# Patient Record
Sex: MALE | Race: WHITE | NOT HISPANIC OR LATINO | Employment: FULL TIME | URBAN - METROPOLITAN AREA
[De-identification: names, ages, dates, MRNs, and addresses within clinical notes are randomized per-mention and may not be internally consistent; named-entity substitution may affect disease eponyms.]

---

## 2020-11-23 ENCOUNTER — OFFICE VISIT (OUTPATIENT)
Dept: ORTHOPEDIC SURGERY | Facility: CLINIC | Age: 40
End: 2020-11-23

## 2020-11-23 VITALS — HEIGHT: 73 IN | WEIGHT: 172 LBS | TEMPERATURE: 98 F | BODY MASS INDEX: 22.8 KG/M2

## 2020-11-23 DIAGNOSIS — M25.561 RIGHT KNEE PAIN, UNSPECIFIED CHRONICITY: Primary | ICD-10-CM

## 2020-11-23 PROCEDURE — 99204 OFFICE O/P NEW MOD 45 MIN: CPT | Performed by: ORTHOPAEDIC SURGERY

## 2020-11-23 RX ORDER — MULTIPLE VITAMINS W/ MINERALS TAB 9MG-400MCG
1 TAB ORAL DAILY
COMMUNITY

## 2020-11-24 NOTE — PROGRESS NOTES
Patient: Cristian Moscoso    YOB: 1980    Medical Record Number: [id number]    Chief Complaints:  Right knee injury    History of Present Illness:     40 y.o. male patient who presents for evaluation of the right knee.  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  He twisted his knee and felt something pop.  Current pain is described as moderate, constant and aching.  The patient has noticed associated swelling and difficulty bearing weight on the leg.  Denies other injuries or complaints.  Rest and anti-inflammatories have helped modestly with the discomfort.    Allergies:   Allergies   Allergen Reactions   • Soy Protein [Soybean Oil] Other (See Comments)     Mild constriction of throat.       Home Medications    Current Outpatient Medications:   •  Ascorbic Acid (VITAMIN C PO), Take  by mouth., Disp: , Rfl:   •  multivitamin with minerals (MULTIVITAMIN ADULT PO), Take 1 tablet by mouth Daily., Disp: , Rfl:     History reviewed. No pertinent past medical history.    History reviewed. No pertinent surgical history.    Social History     Occupational History   • Not on file   Tobacco Use   • Smoking status: Not on file   Substance and Sexual Activity   • Alcohol use: Not on file   • Drug use: Not on file   • Sexual activity: Not on file      Social History     Social History Narrative   • Not on file   He is a teacher.  No history of alcohol abuse, illicit drug use or tobacco use.    History reviewed. No pertinent family history.    Review of Systems:      Constitutional: Denies fever, shaking or chills   Eyes: Denies change in visual acuity   HEENT: Denies nasal congestion or sore throat   Respiratory: Denies cough or shortness of breath   Cardiovascular: Denies chest pain or edema  Endocrine: Denies tremors, palpitations, intolerance of heat or cold, polyuria, polydipsia.  GI: Denies abdominal pain, nausea, vomiting, bloody stools or diarrhea  : Denies  "frequency, urgency, incontinence, retention, or nocturia.  Musculoskeletal: Denies numbness, tingling or loss of motor function except as above  Integument: Denies rash, lesion or ulceration   Neurologic: Denies headache or focal weakness, deficits  Heme: Denies spontaneous or excessive bleeding, epistaxis, hematuria, melena, fatigue, enlarged or tender lymph nodes.      All other pertinent positives and negatives as noted above in HPI.    Physical Exam:   40 y.o. male  Vitals:    11/23/20 0959   Temp: 98 °F (36.7 °C)   Weight: 78 kg (172 lb)   Height: 185.4 cm (73\")     General:  Patient is awake and alert.  Appears in no acute distress or discomfort.    Psych:  Affect and demeanor are appropriate.    Eyes:  Conjunctiva and sclera appear grossly normal.  Eyes track well and EOM seem to be intact.    Ears:  No gross abnormalities.  Hearing adequate for the exam.    Cardiovascular:  Regular rate and rhythm.    Lungs:  Good chest expansion.  Breathing unlabored.    Spine:  Back appears grossly normal.  No palpable masses or adenopathy.  Good motion.    Extremities:  Right knee is examined.  Skin is benign.  No atrophy, swellings, or masses.  Focal tenderness along the lateral joint line.  There is a moderate effusion.  Knee motion is limited and painful.  He lacks 5 degrees of extension and can flex to about 80 degrees.  Positive Lachmans.  Negative pivot shift.  His knee is stable to varus and valgus.  His exam is guarded.  Good strength with hip flexion, knee extension, ankle and great toe plantar flexion and dorsiflexion.  Sensation is intact distally.  Brisk capillary refill in the toes.  Palpable pedal pulses.         Radiology:   Outside AP and lateral views of the right knee are brought in by the patient and reviewed.  He has a Segond fracture.  No other significant abnormalities on the x-rays.  MRI the right knee is reviewed along with the report.  He has a complete ACL tear and bone bruise pattern consistent " with that diagnosis.  There is a lateral meniscal tear.  He has a Segond fracture.  No other significant findings.    Assessment/Plan: Right ACL and lateral meniscal tears    I showed him the MRI and we had an extensive discussion about his options.  We discussed surgical and nonsurgical treatment options in detail.  He feels like his knee is unstable and he wants to pursue surgical reconstruction.  I told him that we need him to recover his motion before it will be safe to proceed with surgery.  I think that is probably going to take him a few weeks.  I recommend that we go ahead and get him into therapy at this time.  He was given an order for that.  We can tentatively look at getting him scheduled for surgery in mid to late December.  We thoroughly discussed the details of the proposed procedure including ACL reconstruction with allograft, lateral meniscal repair versus debridement and addressing any unforseen pathology as indicated.  I reviewed details of procedure with patient today and discussed all the risks, benefits, alternatives, and limitations of the procedure in laymen's terms with the risks including but not limited to:  neurovascular damage, bleeding, infection, hematoma, chronic pain, worsening of pain, re-tear potentially necessitating revision, hardware related complications including pain or problems necessitating removal, chondrolysis, swelling, loss of motion and arthrofibrosis, weakness, stiffness, instability, DVT, pulmonary embolus, death, stroke, complex regional pain syndrome, and need for additional procedures.  Furthermore, we discussed graft options including autograft versus allograft and the pros and cons of each.  He verbalized understanding, and was given the opportunity to ask and have all questions answered today.  He has consented to proceed with use of allograft.  No guarantees were given regarding results of surgery.     Duglas Salomon MD    11/23/2020

## 2020-11-25 ENCOUNTER — PREP FOR SURGERY (OUTPATIENT)
Dept: OTHER | Facility: HOSPITAL | Age: 40
End: 2020-11-25

## 2020-12-03 ENCOUNTER — PREP FOR SURGERY (OUTPATIENT)
Dept: OTHER | Facility: HOSPITAL | Age: 40
End: 2020-12-03

## 2020-12-04 ENCOUNTER — PREP FOR SURGERY (OUTPATIENT)
Dept: OTHER | Facility: HOSPITAL | Age: 40
End: 2020-12-04

## 2020-12-04 DIAGNOSIS — M25.561 RIGHT KNEE PAIN, UNSPECIFIED CHRONICITY: Primary | ICD-10-CM

## 2020-12-04 RX ORDER — CEFAZOLIN SODIUM 2 G/100ML
2 INJECTION, SOLUTION INTRAVENOUS ONCE
Status: CANCELLED | OUTPATIENT
Start: 2021-03-23 | End: 2020-12-04

## 2020-12-07 ENCOUNTER — TELEPHONE (OUTPATIENT)
Dept: ORTHOPEDIC SURGERY | Facility: CLINIC | Age: 40
End: 2020-12-07

## 2020-12-07 PROBLEM — M25.561 RIGHT KNEE PAIN: Status: ACTIVE | Noted: 2020-12-07

## 2020-12-07 NOTE — TELEPHONE ENCOUNTER
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    LISETTE NEWBERRY MAY BE REACHED AT:  [phone]

## 2020-12-14 ENCOUNTER — TELEPHONE (OUTPATIENT)
Dept: ORTHOPEDIC SURGERY | Facility: CLINIC | Age: 40
End: 2020-12-14

## 2020-12-14 NOTE — TELEPHONE ENCOUNTER
Probably not for 1 month.  If it was his left knee, he could drive within 1 week.  Because it is the right, he will probably need about a month.

## 2020-12-14 NOTE — TELEPHONE ENCOUNTER
Provider: CLAUDIA BAXTER  Caller: URBAN CONI  Relationship to Patient: SELF    Phone Number: [phone]    Reason for Call: PATIENT CALLED WANTING TO KNOW IF HE IS ABLE TO DRIVE    When was the patient last seen: 11/23/2020

## 2020-12-15 NOTE — TELEPHONE ENCOUNTER
Patient states he has been doing PT on his own since his r knee injury 10/18/2020. patient states he feels he can drive and he is walking w/out crutches for the last 2 weeks. [facility]. Is it ok for him to drive?

## 2020-12-15 NOTE — TELEPHONE ENCOUNTER
Yes.  Sorry, I misunderstood.  He can drive now.  I thought he meant after surgery.  After the surgery I recommend no driving for about a month because it is his right knee.

## 2021-01-20 ENCOUNTER — OFFICE VISIT (OUTPATIENT)
Dept: ORTHOPEDIC SURGERY | Facility: CLINIC | Age: 41
End: 2021-01-20

## 2021-01-20 VITALS — HEIGHT: 73 IN | TEMPERATURE: 97.2 F | BODY MASS INDEX: 22.4 KG/M2 | WEIGHT: 169 LBS

## 2021-01-20 DIAGNOSIS — Z01.818 PRE-OP EVALUATION: Primary | ICD-10-CM

## 2021-01-20 PROCEDURE — S0260 H&P FOR SURGERY: HCPCS | Performed by: ORTHOPAEDIC SURGERY

## 2021-01-20 NOTE — PROGRESS NOTES
"   History & Physical       Patient: Cristian Moscoso    YOB: 1980    Medical Record Number: [id number]    Chief Complaints:  Right knee ACL tear, lateral meniscal tear    History of Present Illness: 40 y.o. male presents today in anticipation for his upcoming right knee ACL reconstruction.  Denies any significant changes to his symptomatology.  He is still experiencing symptomatic instability.  He says that his pain is basically resolved at this point and his motion has returned to normal.    Allergies:   Allergies   Allergen Reactions   • Soy Protein [Soybean Oil] Other (See Comments)     Mild constriction of throat.       Medications:   Home Medications:    Current Outpatient Medications:   •  Ascorbic Acid (VITAMIN C PO), Take  by mouth., Disp: , Rfl:   •  multivitamin with minerals (MULTIVITAMIN ADULT PO), Take 1 tablet by mouth Daily., Disp: , Rfl:     History reviewed. No pertinent past medical history.     History reviewed. No pertinent surgical history.       Social History     Occupational History   • Not on file   Tobacco Use   • Smoking status: Not on file   Substance and Sexual Activity   • Alcohol use: Not on file   • Drug use: Not on file   • Sexual activity: Not on file      Social History     Social History Narrative   • Not on file        History reviewed. No pertinent family history.    Review of Systems:  A 14 point review of systems is reviewed with the patient.  Pertinent positives are listed above.  All others are negative.    Physical Exam: 40 y.o. male    Vitals:    01/20/21 1116   Temp: 97.2 °F (36.2 °C)   Weight: 76.7 kg (169 lb)   Height: 185.4 cm (73\")       General:  Patient is awake and alert.  Appears in no acute distress or discomfort.    Psych:  Affect and demeanor are appropriate.    Eyes:  Conjunctiva and sclera appear grossly normal.  Eyes track well and EOM seem to be intact.    Ears:  No gross abnormalities.  Hearing adequate for the exam.    Cardiovascular:  " Regular rate and rhythm.    Lungs:  Good chest expansion.  Breathing unlabored.    Lymph:  No palpable adenopathy about neck or axilla.    Right lower extremity:  Skin benign and intact without evidence for swelling, masses or atrophy.  No palpable masses. No focal tenderness noted.  .  ROM is full.  Negative medial Robin's, Positive lateral Robin's.  Positive Lachman's but the knee is otherwise stable.  Good strength throughout the lower leg and foot.  Intact sensation throughout.  Palpable pedal pulses with brisk cap refill.    Diagnostic Tests:  No results found for: GLUCOSE, CALCIUM, NA, K, CO2, CL, BUN, CREATININE, EGFRIFAFRI, EGFRIFNONA, BCR, ANIONGAP  No results found for: WBC, HGB, HCT, MCV, PLT  No results found for: INR, PROTIME    Imaging:  Previous x-rays and MRI of the right knee are reviewed.  The studies show a complete ACL tear, bone bruise and lateral meniscal tear    Assessment:  Right knee ACL tear    Plan: We will plan on proceeding with a right knee arthroscopy at the patient's request including ACL reconstruction with allograft, lateral meniscal repair versus debridement and addressing any unforseen pathology as indicated.  I reviewed details of procedure with patient today and discussed all the risks, benefits, alternatives, and limitations of the procedure in laymen's terms with the risks including but not limited to:  neurovascular damage, bleeding, infection, hematoma, chronic pain, worsening of pain, re-tear potentially necessitating revision, hardware related complications including pain or problems necessitating removal, chondrolysis, swelling, loss of motion and arthrofibrosis, weakness, stiffness, instability, DVT, pulmonary embolus, death, stroke, complex regional pain syndrome, and need for additional procedures.  Furthermore, we discussed graft options including autograft versus allograft and the pros and cons of each.  With allograft we talked about the higher rate of re-tear  and concerns related to allograft itself including possible disease transmission.  Patient verbalized understanding, and was given the opportunity to ask and have all questions answered today.  Patient has consented to proceed with ACL reconstruction with allograft tissue.  No guarantees were given regarding results of surgery.     Date: 1/20/2021    Duglas Salomon MD

## 2021-01-28 ENCOUNTER — TRANSCRIBE ORDERS (OUTPATIENT)
Dept: PREADMISSION TESTING | Facility: HOSPITAL | Age: 41
End: 2021-01-28

## 2021-01-28 DIAGNOSIS — Z01.818 OTHER SPECIFIED PRE-OPERATIVE EXAMINATION: Primary | ICD-10-CM

## 2021-01-29 DIAGNOSIS — M25.561 RIGHT KNEE PAIN, UNSPECIFIED CHRONICITY: Primary | ICD-10-CM

## 2021-01-29 PROBLEM — Z98.890 S/P ACL RECONSTRUCTION: Status: ACTIVE | Noted: 2021-01-29

## 2021-02-02 ENCOUNTER — APPOINTMENT (OUTPATIENT)
Dept: PREADMISSION TESTING | Facility: HOSPITAL | Age: 41
End: 2021-02-02

## 2021-02-02 VITALS
RESPIRATION RATE: 16 BRPM | HEIGHT: 73 IN | HEART RATE: 72 BPM | BODY MASS INDEX: 22.21 KG/M2 | OXYGEN SATURATION: 100 % | TEMPERATURE: 97.6 F | SYSTOLIC BLOOD PRESSURE: 115 MMHG | DIASTOLIC BLOOD PRESSURE: 63 MMHG | WEIGHT: 167.6 LBS

## 2021-02-02 LAB
DEPRECATED RDW RBC AUTO: 37.8 FL (ref 37–54)
ERYTHROCYTE [DISTWIDTH] IN BLOOD BY AUTOMATED COUNT: 11.8 % (ref 12.3–15.4)
HCT VFR BLD AUTO: 41.7 % (ref 37.5–51)
HGB BLD-MCNC: 13.8 G/DL (ref 13–17.7)
MCH RBC QN AUTO: 29.5 PG (ref 26.6–33)
MCHC RBC AUTO-ENTMCNC: 33.1 G/DL (ref 31.5–35.7)
MCV RBC AUTO: 89.1 FL (ref 79–97)
PLATELET # BLD AUTO: 211 10*3/MM3 (ref 140–450)
PMV BLD AUTO: 9.8 FL (ref 6–12)
RBC # BLD AUTO: 4.68 10*6/MM3 (ref 4.14–5.8)
WBC # BLD AUTO: 4.9 10*3/MM3 (ref 3.4–10.8)

## 2021-02-02 PROCEDURE — 85027 COMPLETE CBC AUTOMATED: CPT

## 2021-02-02 PROCEDURE — 36415 COLL VENOUS BLD VENIPUNCTURE: CPT

## 2021-02-02 NOTE — DISCHARGE INSTRUCTIONS
Take the following medications the morning of surgery:    NONE    If you are on prescription narcotic pain medication to control your pain you may also take that medication the morning of surgery.    General Instructions:  • Do not eat solid food after midnight the night before surgery.  • You may drink clear liquids day of surgery but must stop at least one hour before your hospital arrival time.  It is beneficial for you to have a clear drink that contains carbohydrates the day of surgery.  We suggest a 12 to 20 ounce bottle of Gatorade or Powerade for non-diabetic patients  Clear liquids are liquids you can see through.  Nothing red in color.     Plain water                               Sports drinks  Sodas                                   Gelatin (Jell-O)  Fruit juices without pulp such as white grape juice and apple juice  Popsicles that contain no fruit or yogurt  Tea or coffee (no cream or milk added)  Gatorade / Powerade  G2 / Powerade Zero    • .   • Patients who avoid smoking, chewing tobacco and alcohol for 4 weeks prior to surgery have a reduced risk of post-operative complications.  Quit smoking as many days before surgery as you can.  • Do not smoke, use chewing tobacco or drink alcohol the day of surgery.   • Bring any papers given to you in the doctor’s office.  • Wear clean comfortable clothes.  • Do not wear contact lenses, false eyelashes or make-up.  Bring a case for your glasses.   • Bring crutches or walker if applicable.  • Remove all piercings.  Leave jewelry and any other valuables at home.  • Hair extensions with metal clips must be removed prior to surgery.  • The Pre-Admission Testing nurse will instruct you to bring medications if unable to obtain an accurate list in Pre-Admission Testing.   • REPORT TO MAIN AND THEN OVER TO OUTPATIENT SURGERY ON 2- AT 0530 AM           Preventing a Surgical Site Infection:  • For 2 to 3 days before surgery, avoid shaving with a razor because  the razor can irritate skin and make it easier to develop an infection.    • Any areas of open skin can increase the risk of a post-operative wound infection by allowing bacteria to enter and travel throughout the body.  Notify your surgeon if you have any skin wounds / rashes even if it is not near the expected surgical site.  The area will need assessed to determine if surgery should be delayed until it is healed.  • The night prior to surgery shower using a fresh bar of anti-bacterial soap (such as Dial) and clean washcloth.  Sleep in a clean bed with clean clothing.  Do not allow pets to sleep with you.  • Shower on the morning of surgery using a fresh bar of anti-bacterial soap (such as Dial) and clean washcloth.  Dry with a clean towel and dress in clean clothing.  • Ask your surgeon if you will be receiving antibiotics prior to surgery.  • Make sure you, your family, and all healthcare providers clean their hands with soap and water or an alcohol based hand  before caring for you or your wound.    Day of surgery:  Your arrival time is approximately two hours before your scheduled surgery time.  Upon arrival, a Pre-op nurse and Anesthesiologist will review your health history, obtain vital signs, and answer questions you may have.  The only belongings needed at this time will be a list of your home medications and if applicable your C-PAP/BI-PAP machine.  A Pre-op nurse will start an IV and you may receive medication in preparation for surgery, including something to help you relax.     Please be aware that surgery does come with discomfort.  We want to make every effort to control your discomfort so please discuss any uncontrolled symptoms with your nurse.   Your doctor will most likely have prescribed pain medications.      If you are going home after surgery you will receive individualized written care instructions before being discharged.  A responsible adult must drive you to and from the  hospital on the day of your surgery and stay with you for 24 hours.  Discharge prescriptions can be filled by the hospital pharmacy during regular pharmacy hours.  If you are having surgery late in the day/evening your prescription may be e-prescribed to your pharmacy.  Please verify your pharmacy hours or chose a 24 hour pharmacy to avoid not having access to your prescription because your pharmacy has closed for the day.        If you have any questions please call Pre-Admission Testing at [phone].  Deductibles and co-payments are collected on the day of service. Please be prepared to pay the required co-pay, deductible or deposit on the day of service as defined by your plan.    Patient Education for Self-Quarantine Process    Following your COVID testing, we strongly recommend that you do not leave your home after you have been tested for COVID except to get medical care. This includes not going to work, school or to public areas.  If this is not possible for you to do please limit your activities to only required outings.  Be sure to wear a mask when you are with other people, practice social distancing and wash your hands frequently.      The following items provide additional details to keep you safe.  • Wash your hands with soap and water frequently for at least 20 seconds.   • Avoid touching your eyes, nose and mouth with unwashed hands.  • Do not share anything - utensils, towels, food from the same bowl.   • Have your own utensils, drinking glass, dishes, towels and bedding.   • Do not have visitors.   • Do use FaceTime to stay in touch with family and friends.  • You should stay in a specific room away from others if possible.   • Stay at least 6 feet away from others in the home if you cannot have a dedicated room to yourself.   • Do not snuggle with your pet. While the CDC says there is no evidence that pets can spread COVID-19 or be infected from humans, it is probably best to avoid “petting,  snuggling, being kissed or licked and sharing food (during self-quarantine)”, according to the CDC.   • Sanitize household surfaces daily. Include all high touch areas (door handles, light switches, phones, countertops, etc.)  • Do not share a bathroom with others, if possible.   • Wear a mask around others in your home if you are unable to stay in a separate room or 6 feet apart. If  you are unable to wear a mask, have your family member wear a mask if they must be within 6 feet of you.   Call your surgeon immediately if you experience any of the following symptoms:  • Sore Throat  • Shortness of Breath or difficulty breathing  • Cough  • Chills  • Body soreness or muscle pain  • Headache  • Fever  • New loss of taste or smell  • Do not arrive for your surgery ill.  Your procedure will need to be rescheduled to another time.  You will need to call your physician before the day of surgery to avoid any unnecessary exposure to hospital staff as well as other patients.

## 2021-02-13 ENCOUNTER — LAB (OUTPATIENT)
Dept: LAB | Facility: HOSPITAL | Age: 41
End: 2021-02-13

## 2021-02-13 DIAGNOSIS — Z01.818 OTHER SPECIFIED PRE-OPERATIVE EXAMINATION: ICD-10-CM

## 2021-02-13 PROCEDURE — U0004 COV-19 TEST NON-CDC HGH THRU: HCPCS

## 2021-02-13 PROCEDURE — C9803 HOPD COVID-19 SPEC COLLECT: HCPCS

## 2021-02-15 LAB — SARS-COV-2 RNA RESP QL NAA+PROBE: NOT DETECTED

## 2021-02-16 ENCOUNTER — APPOINTMENT (OUTPATIENT)
Dept: GENERAL RADIOLOGY | Facility: HOSPITAL | Age: 41
End: 2021-02-16

## 2021-02-16 ENCOUNTER — ANESTHESIA EVENT (OUTPATIENT)
Dept: PERIOP | Facility: HOSPITAL | Age: 41
End: 2021-02-16

## 2021-02-16 ENCOUNTER — HOSPITAL ENCOUNTER (OUTPATIENT)
Facility: HOSPITAL | Age: 41
Setting detail: HOSPITAL OUTPATIENT SURGERY
Discharge: HOME | End: 2021-02-16
Attending: ORTHOPAEDIC SURGERY | Admitting: ORTHOPAEDIC SURGERY

## 2021-02-16 ENCOUNTER — ANESTHESIA (OUTPATIENT)
Dept: PERIOP | Facility: HOSPITAL | Age: 41
End: 2021-02-16

## 2021-02-16 VITALS
DIASTOLIC BLOOD PRESSURE: 83 MMHG | RESPIRATION RATE: 16 BRPM | HEART RATE: 55 BPM | TEMPERATURE: 97.3 F | OXYGEN SATURATION: 100 % | SYSTOLIC BLOOD PRESSURE: 125 MMHG

## 2021-02-16 DIAGNOSIS — M25.561 RIGHT KNEE PAIN, UNSPECIFIED CHRONICITY: ICD-10-CM

## 2021-02-16 DIAGNOSIS — Z98.890 S/P ACL RECONSTRUCTION: Primary | ICD-10-CM

## 2021-02-16 PROCEDURE — 76000 FLUOROSCOPY <1 HR PHYS/QHP: CPT

## 2021-02-16 PROCEDURE — 25010000002 MIDAZOLAM PER 1 MG: Performed by: ANESTHESIOLOGY

## 2021-02-16 PROCEDURE — 25010000003 CEFAZOLIN IN DEXTROSE 2-4 GM/100ML-% SOLUTION: Performed by: NURSE PRACTITIONER

## 2021-02-16 PROCEDURE — 25010000002 DEXAMETHASONE PER 1 MG: Performed by: NURSE ANESTHETIST, CERTIFIED REGISTERED

## 2021-02-16 PROCEDURE — 25010000002 GENTAMICIN PER 80 MG: Performed by: ORTHOPAEDIC SURGERY

## 2021-02-16 PROCEDURE — 25010000002 FENTANYL CITRATE (PF) 100 MCG/2ML SOLUTION: Performed by: NURSE ANESTHETIST, CERTIFIED REGISTERED

## 2021-02-16 PROCEDURE — C1713 ANCHOR/SCREW BN/BN,TIS/BN: HCPCS | Performed by: ORTHOPAEDIC SURGERY

## 2021-02-16 PROCEDURE — 25010000002 PROPOFOL 10 MG/ML EMULSION: Performed by: NURSE ANESTHETIST, CERTIFIED REGISTERED

## 2021-02-16 PROCEDURE — 29888 ARTHRS AID ACL RPR/AGMNTJ: CPT | Performed by: ORTHOPAEDIC SURGERY

## 2021-02-16 PROCEDURE — L1833 KO ADJ JNT POS R SUP PRE OTS: HCPCS | Performed by: ORTHOPAEDIC SURGERY

## 2021-02-16 PROCEDURE — L1830 KO IMMOB CANVAS LONG PRE OTS: HCPCS | Performed by: ORTHOPAEDIC SURGERY

## 2021-02-16 PROCEDURE — 29888 ARTHRS AID ACL RPR/AGMNTJ: CPT | Performed by: NURSE PRACTITIONER

## 2021-02-16 PROCEDURE — 73560 X-RAY EXAM OF KNEE 1 OR 2: CPT

## 2021-02-16 PROCEDURE — 29881 ARTHRS KNE SRG MNISECTMY M/L: CPT | Performed by: ORTHOPAEDIC SURGERY

## 2021-02-16 PROCEDURE — 76942 ECHO GUIDE FOR BIOPSY: CPT | Performed by: ORTHOPAEDIC SURGERY

## 2021-02-16 PROCEDURE — 25010000002 DIPHENHYDRAMINE PER 50 MG: Performed by: NURSE ANESTHETIST, CERTIFIED REGISTERED

## 2021-02-16 PROCEDURE — 25010000002 ROPIVACAINE PER 1 MG: Performed by: ANESTHESIOLOGY

## 2021-02-16 PROCEDURE — 25010000002 ONDANSETRON PER 1 MG: Performed by: NURSE ANESTHETIST, CERTIFIED REGISTERED

## 2021-02-16 PROCEDURE — 25010000002 FENTANYL CITRATE (PF) 100 MCG/2ML SOLUTION: Performed by: ANESTHESIOLOGY

## 2021-02-16 PROCEDURE — 25010000002 KETOROLAC TROMETHAMINE PER 15 MG: Performed by: NURSE ANESTHETIST, CERTIFIED REGISTERED

## 2021-02-16 DEVICE — ALLOGRFT GRAFTLINK CONV PK: Type: IMPLANTABLE DEVICE | Site: KNEE | Status: FUNCTIONAL

## 2021-02-16 DEVICE — GRFT ACL FLEXIGRFT FZ 7.5 TO 10.5X60 TO 80MM: Type: IMPLANTABLE DEVICE | Site: KNEE | Status: FUNCTIONAL

## 2021-02-16 RX ORDER — ROPIVACAINE HYDROCHLORIDE 5 MG/ML
INJECTION, SOLUTION EPIDURAL; INFILTRATION; PERINEURAL
Status: COMPLETED | OUTPATIENT
Start: 2021-02-16 | End: 2021-02-16

## 2021-02-16 RX ORDER — LABETALOL HYDROCHLORIDE 5 MG/ML
10 INJECTION, SOLUTION INTRAVENOUS EVERY 6 HOURS PRN
Status: DISCONTINUED | OUTPATIENT
Start: 2021-02-16 | End: 2021-02-16 | Stop reason: HOSPADM

## 2021-02-16 RX ORDER — LABETALOL HYDROCHLORIDE 5 MG/ML
5 INJECTION, SOLUTION INTRAVENOUS
Status: DISCONTINUED | OUTPATIENT
Start: 2021-02-16 | End: 2021-02-16 | Stop reason: HOSPADM

## 2021-02-16 RX ORDER — SODIUM CHLORIDE, SODIUM LACTATE, POTASSIUM CHLORIDE, AND CALCIUM CHLORIDE .6; .31; .03; .02 G/100ML; G/100ML; G/100ML; G/100ML
IRRIGANT IRRIGATION AS NEEDED
Status: DISCONTINUED | OUTPATIENT
Start: 2021-02-16 | End: 2021-02-16 | Stop reason: HOSPADM

## 2021-02-16 RX ORDER — FAMOTIDINE 10 MG/ML
20 INJECTION, SOLUTION INTRAVENOUS ONCE
Status: COMPLETED | OUTPATIENT
Start: 2021-02-16 | End: 2021-02-16

## 2021-02-16 RX ORDER — EPHEDRINE SULFATE 50 MG/ML
INJECTION, SOLUTION INTRAVENOUS AS NEEDED
Status: DISCONTINUED | OUTPATIENT
Start: 2021-02-16 | End: 2021-02-16 | Stop reason: SURG

## 2021-02-16 RX ORDER — DIPHENHYDRAMINE HYDROCHLORIDE 50 MG/ML
12.5 INJECTION INTRAMUSCULAR; INTRAVENOUS
Status: DISCONTINUED | OUTPATIENT
Start: 2021-02-16 | End: 2021-02-16 | Stop reason: HOSPADM

## 2021-02-16 RX ORDER — HYDROCODONE BITARTRATE AND ACETAMINOPHEN 7.5; 325 MG/1; MG/1
1 TABLET ORAL ONCE AS NEEDED
Status: COMPLETED | OUTPATIENT
Start: 2021-02-16 | End: 2021-02-16

## 2021-02-16 RX ORDER — CEFAZOLIN SODIUM 2 G/100ML
2 INJECTION, SOLUTION INTRAVENOUS ONCE
Status: COMPLETED | OUTPATIENT
Start: 2021-02-16 | End: 2021-02-16

## 2021-02-16 RX ORDER — ACETAMINOPHEN 650 MG
TABLET, EXTENDED RELEASE ORAL AS NEEDED
Status: DISCONTINUED | OUTPATIENT
Start: 2021-02-16 | End: 2021-02-16 | Stop reason: HOSPADM

## 2021-02-16 RX ORDER — ONDANSETRON 4 MG/1
4 TABLET, FILM COATED ORAL ONCE AS NEEDED
Status: DISCONTINUED | OUTPATIENT
Start: 2021-02-16 | End: 2021-02-16 | Stop reason: HOSPADM

## 2021-02-16 RX ORDER — LIDOCAINE HYDROCHLORIDE 10 MG/ML
0.5 INJECTION, SOLUTION EPIDURAL; INFILTRATION; INTRACAUDAL; PERINEURAL ONCE AS NEEDED
Status: DISCONTINUED | OUTPATIENT
Start: 2021-02-16 | End: 2021-02-16 | Stop reason: HOSPADM

## 2021-02-16 RX ORDER — ONDANSETRON 2 MG/ML
INJECTION INTRAMUSCULAR; INTRAVENOUS AS NEEDED
Status: DISCONTINUED | OUTPATIENT
Start: 2021-02-16 | End: 2021-02-16 | Stop reason: SURG

## 2021-02-16 RX ORDER — MIDAZOLAM HYDROCHLORIDE 1 MG/ML
2 INJECTION INTRAMUSCULAR; INTRAVENOUS
Status: DISCONTINUED | OUTPATIENT
Start: 2021-02-16 | End: 2021-02-16 | Stop reason: HOSPADM

## 2021-02-16 RX ORDER — FENTANYL CITRATE 50 UG/ML
50 INJECTION, SOLUTION INTRAMUSCULAR; INTRAVENOUS
Status: DISCONTINUED | OUTPATIENT
Start: 2021-02-16 | End: 2021-02-16 | Stop reason: HOSPADM

## 2021-02-16 RX ORDER — ONDANSETRON 4 MG/1
4 TABLET, FILM COATED ORAL EVERY 8 HOURS PRN
Qty: 30 TABLET | Refills: 0 | Status: SHIPPED | OUTPATIENT
Start: 2021-02-16 | End: 2021-03-17

## 2021-02-16 RX ORDER — PROMETHAZINE HYDROCHLORIDE 25 MG/1
25 TABLET ORAL ONCE AS NEEDED
Status: DISCONTINUED | OUTPATIENT
Start: 2021-02-16 | End: 2021-02-16 | Stop reason: HOSPADM

## 2021-02-16 RX ORDER — EPHEDRINE SULFATE 50 MG/ML
5 INJECTION, SOLUTION INTRAVENOUS ONCE AS NEEDED
Status: DISCONTINUED | OUTPATIENT
Start: 2021-02-16 | End: 2021-02-16 | Stop reason: HOSPADM

## 2021-02-16 RX ORDER — DIPHENHYDRAMINE HYDROCHLORIDE 50 MG/ML
INJECTION INTRAMUSCULAR; INTRAVENOUS AS NEEDED
Status: DISCONTINUED | OUTPATIENT
Start: 2021-02-16 | End: 2021-02-16 | Stop reason: SURG

## 2021-02-16 RX ORDER — DEXAMETHASONE SODIUM PHOSPHATE 10 MG/ML
INJECTION INTRAMUSCULAR; INTRAVENOUS AS NEEDED
Status: DISCONTINUED | OUTPATIENT
Start: 2021-02-16 | End: 2021-02-16 | Stop reason: SURG

## 2021-02-16 RX ORDER — FLUMAZENIL 0.1 MG/ML
0.2 INJECTION INTRAVENOUS AS NEEDED
Status: DISCONTINUED | OUTPATIENT
Start: 2021-02-16 | End: 2021-02-16 | Stop reason: HOSPADM

## 2021-02-16 RX ORDER — DIPHENHYDRAMINE HCL 25 MG
25 CAPSULE ORAL
Status: DISCONTINUED | OUTPATIENT
Start: 2021-02-16 | End: 2021-02-16 | Stop reason: HOSPADM

## 2021-02-16 RX ORDER — HYDROMORPHONE HYDROCHLORIDE 1 MG/ML
0.5 INJECTION, SOLUTION INTRAMUSCULAR; INTRAVENOUS; SUBCUTANEOUS
Status: DISCONTINUED | OUTPATIENT
Start: 2021-02-16 | End: 2021-02-16 | Stop reason: HOSPADM

## 2021-02-16 RX ORDER — PROMETHAZINE HYDROCHLORIDE 25 MG/1
25 SUPPOSITORY RECTAL ONCE AS NEEDED
Status: DISCONTINUED | OUTPATIENT
Start: 2021-02-16 | End: 2021-02-16 | Stop reason: HOSPADM

## 2021-02-16 RX ORDER — NALOXONE HCL 0.4 MG/ML
0.2 VIAL (ML) INJECTION AS NEEDED
Status: DISCONTINUED | OUTPATIENT
Start: 2021-02-16 | End: 2021-02-16 | Stop reason: HOSPADM

## 2021-02-16 RX ORDER — SODIUM CHLORIDE 0.9 % (FLUSH) 0.9 %
3 SYRINGE (ML) INJECTION EVERY 12 HOURS SCHEDULED
Status: DISCONTINUED | OUTPATIENT
Start: 2021-02-16 | End: 2021-02-16 | Stop reason: HOSPADM

## 2021-02-16 RX ORDER — DOCUSATE SODIUM 100 MG/1
100 CAPSULE, LIQUID FILLED ORAL 2 TIMES DAILY
Qty: 60 CAPSULE | Refills: 0 | Status: SHIPPED | OUTPATIENT
Start: 2021-02-16 | End: 2021-03-17

## 2021-02-16 RX ORDER — LIDOCAINE HYDROCHLORIDE 20 MG/ML
INJECTION, SOLUTION INFILTRATION; PERINEURAL AS NEEDED
Status: DISCONTINUED | OUTPATIENT
Start: 2021-02-16 | End: 2021-02-16 | Stop reason: SURG

## 2021-02-16 RX ORDER — OXYCODONE AND ACETAMINOPHEN 7.5; 325 MG/1; MG/1
1 TABLET ORAL ONCE AS NEEDED
Status: DISCONTINUED | OUTPATIENT
Start: 2021-02-16 | End: 2021-02-16 | Stop reason: HOSPADM

## 2021-02-16 RX ORDER — SODIUM CHLORIDE, SODIUM LACTATE, POTASSIUM CHLORIDE, CALCIUM CHLORIDE 600; 310; 30; 20 MG/100ML; MG/100ML; MG/100ML; MG/100ML
9 INJECTION, SOLUTION INTRAVENOUS CONTINUOUS
Status: DISCONTINUED | OUTPATIENT
Start: 2021-02-16 | End: 2021-02-16 | Stop reason: HOSPADM

## 2021-02-16 RX ORDER — HYDROCODONE BITARTRATE AND ACETAMINOPHEN 7.5; 325 MG/1; MG/1
1-2 TABLET ORAL EVERY 4 HOURS PRN
Qty: 42 TABLET | Refills: 0 | Status: SHIPPED | OUTPATIENT
Start: 2021-02-16 | End: 2021-03-17

## 2021-02-16 RX ORDER — KETOROLAC TROMETHAMINE 30 MG/ML
INJECTION, SOLUTION INTRAMUSCULAR; INTRAVENOUS AS NEEDED
Status: DISCONTINUED | OUTPATIENT
Start: 2021-02-16 | End: 2021-02-16 | Stop reason: SURG

## 2021-02-16 RX ORDER — ISOPROPYL ALCOHOL 70 ML/100ML
LIQUID TOPICAL AS NEEDED
Status: DISCONTINUED | OUTPATIENT
Start: 2021-02-16 | End: 2021-02-16 | Stop reason: HOSPADM

## 2021-02-16 RX ORDER — FENTANYL CITRATE 50 UG/ML
INJECTION, SOLUTION INTRAMUSCULAR; INTRAVENOUS AS NEEDED
Status: DISCONTINUED | OUTPATIENT
Start: 2021-02-16 | End: 2021-02-16 | Stop reason: SURG

## 2021-02-16 RX ORDER — PROPOFOL 10 MG/ML
VIAL (ML) INTRAVENOUS AS NEEDED
Status: DISCONTINUED | OUTPATIENT
Start: 2021-02-16 | End: 2021-02-16 | Stop reason: SURG

## 2021-02-16 RX ORDER — ONDANSETRON 2 MG/ML
4 INJECTION INTRAMUSCULAR; INTRAVENOUS ONCE AS NEEDED
Status: DISCONTINUED | OUTPATIENT
Start: 2021-02-16 | End: 2021-02-16 | Stop reason: HOSPADM

## 2021-02-16 RX ORDER — MIDAZOLAM HYDROCHLORIDE 1 MG/ML
1 INJECTION INTRAMUSCULAR; INTRAVENOUS
Status: DISCONTINUED | OUTPATIENT
Start: 2021-02-16 | End: 2021-02-16 | Stop reason: HOSPADM

## 2021-02-16 RX ORDER — SODIUM CHLORIDE 0.9 % (FLUSH) 0.9 %
3-10 SYRINGE (ML) INJECTION AS NEEDED
Status: DISCONTINUED | OUTPATIENT
Start: 2021-02-16 | End: 2021-02-16 | Stop reason: HOSPADM

## 2021-02-16 RX ADMIN — PROPOFOL 200 MG: 10 INJECTION, EMULSION INTRAVENOUS at 11:20

## 2021-02-16 RX ADMIN — FENTANYL CITRATE 50 MCG: 50 INJECTION INTRAMUSCULAR; INTRAVENOUS at 11:19

## 2021-02-16 RX ADMIN — DEXAMETHASONE SODIUM PHOSPHATE 8 MG: 10 INJECTION INTRAMUSCULAR; INTRAVENOUS at 11:24

## 2021-02-16 RX ADMIN — EPHEDRINE SULFATE 5 MG: 50 INJECTION INTRAVENOUS at 11:25

## 2021-02-16 RX ADMIN — MIDAZOLAM 2 MG: 1 INJECTION INTRAMUSCULAR; INTRAVENOUS at 11:02

## 2021-02-16 RX ADMIN — FENTANYL CITRATE 50 MCG: 50 INJECTION INTRAMUSCULAR; INTRAVENOUS at 11:44

## 2021-02-16 RX ADMIN — FENTANYL CITRATE 100 MCG: 50 INJECTION, SOLUTION INTRAMUSCULAR; INTRAVENOUS at 11:02

## 2021-02-16 RX ADMIN — EPHEDRINE SULFATE 5 MG: 50 INJECTION INTRAVENOUS at 12:19

## 2021-02-16 RX ADMIN — LIDOCAINE HYDROCHLORIDE 60 MG: 20 INJECTION, SOLUTION INFILTRATION; PERINEURAL at 11:20

## 2021-02-16 RX ADMIN — ROPIVACAINE HYDROCHLORIDE 30 ML: 5 INJECTION, SOLUTION EPIDURAL; INFILTRATION; PERINEURAL at 11:08

## 2021-02-16 RX ADMIN — SODIUM CHLORIDE, POTASSIUM CHLORIDE, SODIUM LACTATE AND CALCIUM CHLORIDE 9 ML/HR: 600; 310; 30; 20 INJECTION, SOLUTION INTRAVENOUS at 11:04

## 2021-02-16 RX ADMIN — CEFAZOLIN SODIUM 2 G: 2 INJECTION, SOLUTION INTRAVENOUS at 11:18

## 2021-02-16 RX ADMIN — HYDROCODONE BITARTRATE AND ACETAMINOPHEN 1 TABLET: 7.5; 325 TABLET ORAL at 13:57

## 2021-02-16 RX ADMIN — KETOROLAC TROMETHAMINE 30 MG: 30 INJECTION, SOLUTION INTRAMUSCULAR; INTRAVENOUS at 12:48

## 2021-02-16 RX ADMIN — ONDANSETRON HYDROCHLORIDE 4 MG: 2 SOLUTION INTRAMUSCULAR; INTRAVENOUS at 12:39

## 2021-02-16 RX ADMIN — DIPHENHYDRAMINE HYDROCHLORIDE 25 MG: 50 INJECTION INTRAMUSCULAR; INTRAVENOUS at 11:28

## 2021-02-16 RX ADMIN — FAMOTIDINE 20 MG: 10 INJECTION INTRAVENOUS at 11:05

## 2021-02-16 NOTE — ANESTHESIA PROCEDURE NOTES
Peripheral Block    Pre-sedation assessment completed: 2/16/2021 11:03 AM    Patient reassessed immediately prior to procedure    Patient location during procedure: holding area  Start time: 2/16/2021 11:03 AM  Stop time: 2/16/2021 11:06 AM  Reason for block: at surgeon's request and post-op pain management  Performed by  Anesthesiologist: Tenzin Trotter MD  Preanesthetic Checklist  Completed: patient identified, site marked, surgical consent, pre-op evaluation, timeout performed, IV checked, risks and benefits discussed and monitors and equipment checked  Prep:  Sterile barriers:cap, gloves and mask  Prep: ChloraPrep  Patient monitoring: blood pressure monitoring, continuous pulse oximetry and EKG  Procedure  Sedation:yes  Performed under: local infiltration  Guidance:ultrasound guided  ULTRASOUND INTERPRETATION.  Using ultrasound guidance a 21 G gauge needle was placed in close proximity to the femoral nerve, at which point, under ultrasound guidance anesthetic was injected in the area of the nerve and spread of the anesthesia was seen on ultrasound in close proximity thereto.  There were no abnormalities seen on ultrasound; a digital image was taken; and the patient tolerated the procedure with no complications. Images:still images obtained    Laterality:right  Block Type:femoral  Injection Technique:single-shot  Needle Type:echogenic  Resistance on Injection: none    Medications Used: ropivacaine (NAROPIN) 0.5 % injection, 30 mL      Post Assessment  Injection Assessment: negative aspiration for heme, no paresthesia on injection and incremental injection  Patient Tolerance:comfortable throughout block  Complications:no  Additional Notes  Ultrasound Interpretation:  Using ultrasound guidance the needle was placed in close proximity to the femoral nerve and anesthetic was injected in the area of the femoral nerve and spread of the anesthetic was seen on ultrasound in close proximity thereto.  There were no  abnormalities seen on ultrasound; a digital image was taken; and the patient tolerated the procedure with no complications.    Block placed for postoperative pain control per surgeon request.

## 2021-02-16 NOTE — BRIEF OP NOTE
KNEE ANTERIOR CRUCIATE LIGAMENT RECONSTRUCTION  Progress Note    [facility] [place]  2/16/2021    Pre-op Diagnosis:   Right knee pain, unspecified chronicity [M25.561]       Post-Op Diagnosis Codes:     * Right knee pain, unspecified chronicity [M25.561]    Procedure/CPT® Codes:        Procedure(s):  KNEE ANTERIOR CRUCIATE LIGAMENT RECONSTRUCTION WITH ALLOGRAFT, partial meniscectomy    Surgeon(s):  Duglas Salomon MD    Anesthesia: General with Block    Staff:   Assistant: Andie Scherer APRN  Assistant: Andie Scherer APRN      Estimated Blood Loss: minimal    Urine Voided: * No values recorded between 2/16/2021 12:00 AM and 2/16/2021 11:21 AM *    Specimens:                None          Drains: * No LDAs found *    Findings: see dictation    Complications: none    Assistant: MARIETTA Mills whose assistance was critical for help with patient positioning, suctioning and irrigation, retraction, manipulation of the extremity for insertion of the implants, wound closure and application of the bandages.  Her assistance was critical to the success of this case.    Duglas Salomon MD     Date: 2/16/2021  Time: 0429

## 2021-02-16 NOTE — ANESTHESIA POSTPROCEDURE EVALUATION
Patient: Cristian Moscoso    Procedure Summary     Date: 02/16/21 Room / Location:  [facility]  [facility]    Anesthesia Start: 1117 Anesthesia Stop: 1310    Procedure: RIGHT KNEE ANTERIOR CRUCIATE LIGAMENT RECONSTRUCTION WITH ALLOGRAFT, LATERAL MENISECTOMY (Right Knee) Diagnosis:       Right knee pain, unspecified chronicity      (Right knee pain, unspecified chronicity [M25.561])    Surgeon: Duglas Salomon MD Provider: Tenzin Trotter MD    Anesthesia Type: general with block ASA Status: 1          Anesthesia Type: general with block    Vitals  Vitals Value Taken Time   /88 02/16/21 1415   Temp 36.3 °C (97.3 °F) 02/16/21 1415   Pulse 57 02/16/21 1421   Resp 16 02/16/21 1415   SpO2 100 % 02/16/21 1422   Vitals shown include unvalidated device data.        Post Anesthesia Care and Evaluation    Patient location during evaluation: bedside  Pain management: adequate  Airway patency: patent  Anesthetic complications: No anesthetic complications    Cardiovascular status: acceptable  Respiratory status: acceptable  Hydration status: acceptable

## 2021-02-16 NOTE — ANESTHESIA PROCEDURE NOTES
Airway  Urgency: elective    Date/Time: 2/16/2021 11:21 AM  Airway not difficult    General Information and Staff    Patient location during procedure: OR  Anesthesiologist: Tenzin Trotter MD  CRNA: Naya Lawler CRNA    Indications and Patient Condition  Indications for airway management: airway protection    Preoxygenated: yes  Mask difficulty assessment: 0 - not attempted    Final Airway Details  Final airway type: supraglottic airway      Successful airway: unique  Size 4    Number of attempts at approach: 1  Assessment: lips, teeth, and gum same as pre-op and atraumatic intubation

## 2021-02-16 NOTE — OP NOTE
Orthopaedic Operative Note    Facility: [facility]    Patient: Cristian Moscoso    Medical Record Number: [id number]    YOB: 1980    Dictating Surgeon: Duglas Salomon M.D.*    Date of Operation: 2/16/2021    Pre-Operative Diagnosis:  Right ACL tear, lateral meniscal tear    Post-Operative Diagnosis: Right ACL tear, lateral meniscal tear    Procedure Performed:      1.  All inside right ACL reconstruction with Arthrex Versagraft  2.  Partial lateral meniscectomy    Surgeon: Duglas Salomon MD     Assistant: MARIETTA Mills whose assistance was critical for help with patient positioning, suctioning and irrigation, retraction, manipulation of the extremity for insertion of the implants, wound closure and application of the bandages.  Her assistance was critical to the success of this case.    Anesthesia: Regional followed by general    Complications: None.     Estimated Blood Loss: Less than 50 mL.     Implants:  Arthrex tight rope for femoral tunnel fixation.  Arthrex metal button for tibial tunnel fixation    Specimens: * No orders in the log *     Brief Operative Indication:  The patient had sustained an ACL tear.  We discussed surgical and non-surgical treatment options.  The patient was determined to be a candidate for an all inside ACL reconstruction.   I explained that surgical risks include infection, hematoma, hardware related complications including failure of fixation, graft failure and rerupture, recurrent or persistent instability, allograft related complications including disease transmission, persistent or worsened pain and/or loss of motion, iatrogenic nerve and/or blood vessel injury resulting in permanent weakness, numbness or dysfunction, DVT, PE, positioning related neuropraxia, and anesthesia related complications resulting in death.     Description of Procedure in Detail:  The patient and operative site were identified in the preoperative holding area.  The  surgical site was marked with the patient's confirmation.  Adequate regional anesthesia of the right lower extremity was administered by the anesthesiologist.  The patient was then taken to the operating room and placed in the supine position.  Adequate general anesthesia was then administered.  The right lower extremity was prepped and draped in the standard sterile fashion.  I cleaned the extremity with an alcohol solution.  A Hibiclens scrub was performed.  Lastly, the extremity was prepped with 2 ChloraPreps.  I allowed those to dry for approximately 3 minutes before the draping procedure was carried out.  A timeout was taken and preoperative antibiotics administered prior to surgical incision.  I began the procedure itself by exsanguinating the extremity with an Esmarch bandage and then insufflating the tourniquet to 200 mmHg.      I performed an examination under anesthesia.  The patient had a grossly positive Lachman's and pivot shift maneuver.  The other ligaments in the knee were stable.  I determined that we could go ahead and proceed with the graft preparations at this point.  The graft was prepared in the typical fashion and placed in the tensioner on the back table.      Standard anteromedial and anterolateral portals were established.  The scope was inserted into the joint and a diagnostic arthroscopy performed.  The patellofemoral compartment demonstrated no significant pathology.  His articular cartilage was well preserved.  No loose bodies or other pathology were noted.  The medial and lateral gutters were inspected and confirmed to be free of loose bodies or displaced meniscal fragments.    The medial compartment was entered.  The medial compartment demonstrated no pathology either.  His cartilage looked perfect.  The medial meniscus was completely intact and stable when probed.    I then directed my attention to the notch.  The ACL was torn and there was a positive empty lateral wall sign.  The  ACL stump was debrided with a shaver.  The PCL was intact and stable.    I then entered the lateral compartment.  The lateral compartment had a flap tear of the posterior horn of the lateral meniscus.  This was unstable and warranted debridement.  A combination of a shaver and upbiter were used to remove the unstable portions of the meniscus.  I would estimate that I had to remove the less than half of the insertion of the lateral meniscus.  He still had the majority of the attachment in place and the majority of his posterior horn was fine.  The remainder the meniscus was probed and confirmed to be intact and stable.  The articular cartilage in this compartment looked great as well.    Next, I directed my attention back to the notch.  A limited notchplasty was performed so that I could visualize the back wall.  The scope was switched into the medial portal and then the Arthrex flipcutter device inserted through the lateral portal.  A small incision was created over the lateral aspect of the distal femur to allow for placement of the flip cutter directly adjacent to the bone.  I placed the guide in the anatomic footprint on the femur.  I carefully drilled the pin down into the joint.  This was flipped, adjusted to 10.5 mm and then I back reamed to a depth of 20 mm.  The flip cutter was removed and then a fiber stick shuttled down through the tunnel.      The flipcutter was then used to create a tibial tunnel.  I switched the scope into the anterolateral portal.  I brought the flipcutter into the anteromedial portal.  A fourth small incision was fashioned over the anteromedial aspect of the tibia.  The guide was placed directly in the anatomic footprint of the ACL on the tibia, roughly 7 mm anterior to the PCL, centered between the tibial spines.  I drilled the flip cutter into the knee, adjusted the device to 10.5 mm and back reamed to a depth of 20 mm.  The flip cutter was removed and a second suture shuttled  through the tibial tunnel.  The aperture of the tibial tunnel was carefully debrided.    Both sutures from the 2 tunnels were delivered out of the anteromedial portal.  These were used to pass the graft.  I made sure that there was no soft tissue bridge.  The tight rope was carefully delivered into the femoral tunnel.  This was flipped adjacent to the far cortex and then I toggled the device to deliver the graft into the femoral tunnel.  Once this had seated, I then delivered the graft into the tibial tunnel.  It seated well.  I placed in the metal button on the tibial side and then tensioned the graft appropriately with the leg in 30° of flexion at the knee.  Once I had tensioned the graft, I checked the Lachman's and pivot shift.  Both seem to be restored to normal.  I reinserted the scope and checked the graft at this point.  It was confirmed to be secure and well positioned.  There was no impingement with range of motion.      I directed my attention to closure.  The instruments were withdrawn.  The wounds were irrigated out and closed in layered fashion using Monocryl for the subcutaneous tissues and nylon for the skin.  Sterile dressings were applied.  The leg was placed in a brace.  The patient was awakened and taken to the recovery room in good condition.    Duglas Salomon MD  02/16/21

## 2021-02-16 NOTE — ANESTHESIA PREPROCEDURE EVALUATION
Anesthesia Evaluation     Patient summary reviewed and Nursing notes reviewed   NPO Solid Status: > 8 hours  NPO Liquid Status: > 2 hours           Airway   Mallampati: II  TM distance: >3 FB  Neck ROM: full  Dental - normal exam     Pulmonary - negative pulmonary ROS and normal exam   Cardiovascular - negative cardio ROS and normal exam        Neuro/Psych- negative ROS  GI/Hepatic/Renal/Endo - negative ROS     Musculoskeletal (-) negative ROS    Abdominal    Substance History - negative use     OB/GYN negative ob/gyn ROS         Other                        Anesthesia Plan    ASA 1     general with block       Anesthetic plan, all risks, benefits, and alternatives have been provided, discussed and informed consent has been obtained with: patient.       87 y/o male with PMHx sarcoidosis (on home O2), asthmatic bronchitis, neuropathy, HTN, HLD presenting by recommendation of PCP for increased cough, sputum production, and dyspnea, admitted for sepsis 2/2 PNA. 87 y/o male with MHx sarcoidosis (on home O2), asthmatic bronchitis, neuropathy, HTN, HLD presenting by recommendation of PCP admitted with sepsis 2/2 RML PNA, and exacerbation of asthmatic bronchitis; Found to have elevated Trops likely in the settin goof demand ischemia; ED course c/b aggressive behavior and brief respiratory depression due to Haldol;

## 2021-02-17 ENCOUNTER — TELEPHONE (OUTPATIENT)
Dept: ORTHOPEDIC SURGERY | Facility: CLINIC | Age: 41
End: 2021-02-17

## 2021-02-17 NOTE — TELEPHONE ENCOUNTER
Postop follow-up call  I spoke to Mr. Moscoso.  Reports he is doing well and pain is well controlled.  I encouraged him to primarily stay in the brace and keep his leg straight for this first week.  Dr. Salomon will instruct him on range of motion at his follow-up appointment.  We discussed other postop care instructions.  I encouraged him to call us with any questions or concerns.  He acknowledged understanding and appreciated the call.

## 2021-02-24 ENCOUNTER — OFFICE VISIT (OUTPATIENT)
Dept: ORTHOPEDIC SURGERY | Facility: CLINIC | Age: 41
End: 2021-02-24

## 2021-02-24 VITALS — HEIGHT: 73 IN | TEMPERATURE: 97.2 F | WEIGHT: 167 LBS | BODY MASS INDEX: 22.13 KG/M2

## 2021-02-24 DIAGNOSIS — Z09 SURGERY FOLLOW-UP: Primary | ICD-10-CM

## 2021-02-24 PROCEDURE — 99024 POSTOP FOLLOW-UP VISIT: CPT | Performed by: ORTHOPAEDIC SURGERY

## 2021-02-24 NOTE — PROGRESS NOTES
Cristian Moscoso : 1980 MRN: [id number] DATE: 2021    CC:  1 week status post right knee ACL reconstruction with partial lateral meniscectomy    Vitals:    21 1034   Temp: 97.2 °F (36.2 °C)       HPI: Patient returns to clinic today approximately 1 week out from surgery.  Reports doing well. Pain is well-controlled.  Reports compliance with the restrictions.  Denies any new problems or concerns.    Physical exam: Incisions are well-approximated.  No erythema or drainage.  Motion is limited but appropriate.  Calf is soft.  Negative Homans.  Normal motor and sensory function in the foot.  Good pedal pulses with brisk cap refill.     Impression: 1 week status post right knee ACL reconstruction with partial lateral meniscectomy    Plan:  1.  Begin PT per protocol--prescription given along with 2 copies of my rehab protocol.  2.  Continue use of brace.  3.  Will follow-up in 3 weeks for reevaluation of motion and strength.  4.  Counseled patient about appropriate activity modifications and restrictions, including no driving at this point.    Duglas Salomon MD

## 2021-02-26 ENCOUNTER — TELEPHONE (OUTPATIENT)
Dept: ORTHOPEDIC SURGERY | Facility: CLINIC | Age: 41
End: 2021-02-26

## 2021-02-26 RX ORDER — CEPHALEXIN 500 MG/1
500 CAPSULE ORAL 3 TIMES DAILY
Qty: 15 CAPSULE | Refills: 0 | Status: SHIPPED | OUTPATIENT
Start: 2021-02-26 | End: 2021-08-09

## 2021-02-26 NOTE — TELEPHONE ENCOUNTER
Sx 02/16/2021 RIGHT KNEE ANTERIOR CRUCIATE LIGAMENT RECONSTRUCTION WITH ALLOGRAFT, LATERAL MENISECTOMY    Patient states one of the surgical incision is about 2cm x 3cm swollen, redness, no drainage steri strips still on, last pm sweat profusely, no fever. Please advise

## 2021-02-26 NOTE — TELEPHONE ENCOUNTER
I spoke with him.  It sounds like he may have some irritation of his tibial incision from the brace.  It does not sound like it is infected but all the same, I have agreed to call in a prescription for Keflex for him just as a precaution.  He is going to keep an eye on it and then call us on Monday.  He has not had any signs or symptoms of infection yet but I told him what to watch out for and to call us if anything changes.

## 2021-03-01 ENCOUNTER — TELEPHONE (OUTPATIENT)
Dept: ORTHOPEDIC SURGERY | Facility: CLINIC | Age: 41
End: 2021-03-01

## 2021-03-01 NOTE — TELEPHONE ENCOUNTER
I spoke to Mr. Moscoso.  He did not start taking the antibiotics.  Reports he used ice and elevation and his symptoms are much improved.  He denies drainage, fever, or chills.  I encouraged him to call us with any questions or concerns.

## 2021-03-01 NOTE — TELEPHONE ENCOUNTER
Provider: CLAUDIA BAXTER  Caller: URBAN CONI  Relationship to Patient: SELF    Phone Number: [phone]    Reason for Call: PATIENT CALLING TO GIVE STATUS OF PAIN AND TREATMENT- PER INSTRUCTIONS OF DR BAXTER    When was the patient last seen: 02/24/2021

## 2021-03-10 ENCOUNTER — TELEPHONE (OUTPATIENT)
Dept: ORTHOPEDIC SURGERY | Facility: CLINIC | Age: 41
End: 2021-03-10

## 2021-03-10 NOTE — TELEPHONE ENCOUNTER
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 I would be using crutches to get around, wearing the brace, sitting while in class with limited movement around the room, and catching rides with a co-worker or spouse to and from work.  If yes, I'd like to start this coming Monday, March 15th.  Otherwise, I will wait until my second post-appointment on the 17th to know how things look going forward.    Thank you,  Cristian Moscoso

## 2021-03-10 NOTE — TELEPHONE ENCOUNTER
Can you take care of this?  Give him a note to go back with restriction from prolonged standing and walking.  Thanks

## 2021-03-17 ENCOUNTER — OFFICE VISIT (OUTPATIENT)
Dept: ORTHOPEDIC SURGERY | Facility: CLINIC | Age: 41
End: 2021-03-17

## 2021-03-17 VITALS — BODY MASS INDEX: 21.87 KG/M2 | TEMPERATURE: 97.6 F | HEIGHT: 73 IN | WEIGHT: 165 LBS

## 2021-03-17 DIAGNOSIS — Z09 SURGERY FOLLOW-UP: Primary | ICD-10-CM

## 2021-03-17 PROCEDURE — 99024 POSTOP FOLLOW-UP VISIT: CPT | Performed by: NURSE PRACTITIONER

## 2021-03-17 NOTE — PROGRESS NOTES
Cristian Moscoso : 1980 MRN: [id number] DATE: 3/17/2021    CC:  1 month status post right knee ACL reconstruction     HPI:  Patient returns to clinic today now 4 weeks out from surgery.  Reports doing well.  Pain is well controlled.  Has continued to wear the brace as instructed.  Patient reports he has not been able to begin physical therapy as of yet.  [facility]  Denies any new problems or concerns.    Vitals:    21 1605   Temp: 97.6 °F (36.4 °C)       Physical exam: Incisions are well-healed.  No erythema or drainage.  No significant tenderness.  He has full extension, but very limited flexion.  Negative Homans.  Knee is stable with a Lachman's.  Negative pivot shift maneuver.  Normal motor and sensory function distally.  Good pedal pulses with brisk cap refill.     Impression: 1 month status post right knee ACL reconstruction with partial lateral meniscectomy    Plan: I explained is very important for us to progress his range of motion at this point.  I demonstrated some home exercises for him to begin immediately.  He may begin to wean out of the brace and increase his weight bearing as tolerated.  He is to continue appropriate activity modifications and restrictions.  He will follow-up in 6 weeks with Dr. Salomon.      Andie Scherer, APRN    2021

## 2021-04-28 ENCOUNTER — OFFICE VISIT (OUTPATIENT)
Dept: ORTHOPEDIC SURGERY | Facility: CLINIC | Age: 41
End: 2021-04-28

## 2021-04-28 VITALS — TEMPERATURE: 97.5 F | HEIGHT: 72 IN | WEIGHT: 165 LBS | BODY MASS INDEX: 22.35 KG/M2

## 2021-04-28 DIAGNOSIS — Z09 SURGERY FOLLOW-UP: Primary | ICD-10-CM

## 2021-04-28 PROCEDURE — 99024 POSTOP FOLLOW-UP VISIT: CPT | Performed by: ORTHOPAEDIC SURGERY

## 2021-04-28 NOTE — PROGRESS NOTES
Cristian Moscoso : 1980 MRN: [id number] DATE: 2021    CC: Follow-up status post ACL reconstruction    HPI: Patient returns to clinic today now 3 months out from surgery.  Reports doing well. Denies any new problems or concerns.  PT reportedly progressing well.    Vitals:    21 1600   Temp: 97.5 °F (36.4 °C)       Physical exam: Incisions are well-healed.  No tenderness.  Motion is full.  Negative Homans.  Quad atrophy as expected.  Negative medial and lateral Robin's test.  Knee is stable with a Lachman's.  Negative pivot shift maneuver.  Normal motor and sensory function distally.  Good pedal pulses with brisk cap refill.     Impression:  3 months status post right knee ACL reconstruction, partial lateral meniscectomy    Plan:  1.  Continue PT per protocol--can start to begin cardio and progressive strengthening.  2.  Will follow-up in 3 months for final re-check.  4.  Continue appropriate activity modifications and restrictions.    Duglas Salomon MD    2021

## 2021-08-09 ENCOUNTER — OFFICE VISIT (OUTPATIENT)
Dept: ORTHOPEDIC SURGERY | Facility: CLINIC | Age: 41
End: 2021-08-09

## 2021-08-09 VITALS — BODY MASS INDEX: 22.26 KG/M2 | HEIGHT: 73 IN | WEIGHT: 168 LBS | TEMPERATURE: 97.3 F

## 2021-08-09 DIAGNOSIS — Z09 SURGERY FOLLOW-UP: Primary | ICD-10-CM

## 2021-08-09 PROCEDURE — 99212 OFFICE O/P EST SF 10 MIN: CPT | Performed by: ORTHOPAEDIC SURGERY

## 2021-08-09 NOTE — PROGRESS NOTES
Cristian Moscoso  : 1980   MRN: [id number]   DATE: 2021    CC:  Follow-up status post ACL reconstruction    HPI: Patient returns to clinic today now 6 months out from surgery.  Reports doing well. Denies any new problems or concerns.      Vitals:    21 1504   Temp: 97.3 °F (36.3 °C)       Physical exam:    General:  Awake and alert.  No acute distress.    Extremities:  Incisions are healed.  No tenderness.  No effusion.  Motion is full.  Negative Homans.  Negative medial and lateral Robin's test.  Knee is stable on exam, including a negative Lachman's and negative pivot shift maneuver.  Normal motor and sensory function distally.  Good pedal pulses with brisk cap refill.  Gait pattern is normal.     Impression: Follow-up 6 months status post ACL reconstruction    Plan:    Patient seems to be doing well and can follow-up as needed going forward.  Appropriate activity modifications and restrictions were discussed.  I recommend gradual return to sporting activities and no pivoting or twisting sports for 1 year postoperatively.      Duglas Salomon MD      2021

## 2023-02-16 ENCOUNTER — OFFICE VISIT (OUTPATIENT)
Dept: ORTHOPEDIC SURGERY | Facility: CLINIC | Age: 43
End: 2023-02-16
Payer: OTHER GOVERNMENT

## 2023-02-16 VITALS — HEART RATE: 73 BPM | HEIGHT: 73 IN | WEIGHT: 177 LBS | BODY MASS INDEX: 23.46 KG/M2

## 2023-02-16 DIAGNOSIS — M25.522 LEFT ELBOW PAIN: ICD-10-CM

## 2023-02-16 DIAGNOSIS — M25.562 ACUTE PAIN OF LEFT KNEE: Primary | ICD-10-CM

## 2023-02-16 PROCEDURE — 99213 OFFICE O/P EST LOW 20 MIN: CPT | Performed by: ORTHOPAEDIC SURGERY

## 2023-02-16 RX ORDER — MAGNESIUM 200 MG
TABLET ORAL
COMMUNITY

## 2023-02-16 RX ORDER — NAPROXEN 500 MG/1
500 TABLET ORAL 2 TIMES DAILY WITH MEALS
COMMUNITY
End: 2023-02-23

## 2023-02-16 RX ORDER — CYCLOBENZAPRINE HCL 5 MG
5 TABLET ORAL 3 TIMES DAILY PRN
COMMUNITY

## 2023-02-16 NOTE — PROGRESS NOTES
"     Patient ID: Cristian Moscoso is a 42 y.o. male.    Chief Complaint:    Chief Complaint   Patient presents with   • Left Knee - Initial Evaluation, Pain     Pain 2 DOI 2/13/2023   • Left Elbow - Initial Evaluation, Pain     Pain 4-5 DOI 2/13/2023       HPI:  This is a 42-year-old gentleman who fell on February 13 out of a truck at work he landed on his left knee and elbow has pain over the lateral aspect of the elbow as well as the knee.  He can put weight on the knee and bend it is using a knee sleeve in a sling  Past Medical History:   Diagnosis Date   • ACL (anterior cruciate ligament) rupture     RIGHT KNEE   • Limited joint range of motion     RIGHT SMALL FINGER LIMITED RANGE, UPPER FINGER WITH NO TENDON   • Lumbar herniated disc    • Seasonal allergies    • Tinnitus     RIGHT EAR       Past Surgical History:   Procedure Laterality Date   • HAND SURGERY Right 2000    RIGHT SMALL FINGER HAD TO BE REATTACHED   • KNEE ACL RECONSTRUCTION Right 2/16/2021    Procedure: RIGHT KNEE ANTERIOR CRUCIATE LIGAMENT RECONSTRUCTION WITH ALLOGRAFT, LATERAL MENISECTOMY;  Surgeon: Duglas Salomon MD;  [facility] [place];  Service: Orthopedics;  Laterality: Right;   • WISDOM TOOTH EXTRACTION         Family History   Problem Relation Age of Onset   • Malig Hyperthermia Neg Hx           Social History     Occupational History   • Not on file   Tobacco Use   • Smoking status: Never   • Smokeless tobacco: Never   Vaping Use   • Vaping Use: Never used   Substance and Sexual Activity   • Alcohol use: Yes     Comment: OCCAS TO RARELY   • Drug use: Never   • Sexual activity: Defer      Review of Systems   Cardiovascular: Negative for chest pain.   Musculoskeletal: Positive for arthralgias.       Objective:    Pulse 73   Ht 185.4 cm (73\")   Wt 80.3 kg (177 lb)   BMI 23.35 kg/m²     Physical Examination:  Left elbow demonstrates mild tenderness over the radial head elbow range of motion is 10 to 130 degrees with 80 degrees of " pronation supination no mechanical block  Sensory and motor exam are intact all distributions. Radial pulse is palpable and capillary refill is less than two seconds to all digits.  Left knee no tenderness mild effusion knee range of motion 0 125 no varus valgus laxity with a negative Lachman anterior posterior drawer mild pain on medial Robin.  Sensory and motor exam are intact in all distributions. Dorsalis pedis and posterior tibialis pulses are palpable and capillary refill is less than two seconds to all digits.    Imaging:  left Knee X-Ray  Indication: Knee pain work injury  AP, Lateral views, [place]  Findings: Well-maintained joint spaces no fracture mild effusion  no bony lesion  Soft tissues normal  normal joint spaces  Hardware appropriately positioned not applicable      no prior studies available for comparison.  left Elbow X-Ray  Indication: Elbow pain fall  Views: AP and Lateral views  Findings: Well-maintained joint space with a small intra-articular mildly displaced radial head fracture  no bony lesion  Soft tissues normal  normal joint spaces  Hardware appropriately positioned not applicable      no prior studies available for comparison  Assessment:  Left radial head fracture  Left knee sprain    Plan:  Recommend a sling with elbow range of motion with lifting restrictions for the elbow.  For his knee weight-bear as tolerated ice and anti-inflammatories see me with x-rays in a week if his knee does not ultimately improve MRI  Anticipate off work for 6 weeks    Procedures         Disclaimer: Part of this note may be an electronic transcription/translation of spoken language to printed text using the Dragon Dictation System

## 2023-02-23 ENCOUNTER — OFFICE VISIT (OUTPATIENT)
Dept: ORTHOPEDIC SURGERY | Facility: CLINIC | Age: 43
End: 2023-02-23
Payer: OTHER MISCELLANEOUS

## 2023-02-23 VITALS — HEIGHT: 73 IN | WEIGHT: 177 LBS | BODY MASS INDEX: 23.46 KG/M2 | HEART RATE: 88 BPM

## 2023-02-23 DIAGNOSIS — M25.562 ACUTE PAIN OF LEFT KNEE: Primary | ICD-10-CM

## 2023-02-23 DIAGNOSIS — M25.532 LEFT WRIST PAIN: ICD-10-CM

## 2023-02-23 DIAGNOSIS — S52.122D CLOSED DISPLACED FRACTURE OF HEAD OF LEFT RADIUS WITH ROUTINE HEALING, SUBSEQUENT ENCOUNTER: ICD-10-CM

## 2023-02-23 DIAGNOSIS — M25.522 LEFT ELBOW PAIN: ICD-10-CM

## 2023-02-23 PROCEDURE — 99213 OFFICE O/P EST LOW 20 MIN: CPT | Performed by: ORTHOPAEDIC SURGERY

## 2023-02-23 NOTE — PROGRESS NOTES
"     Patient ID: Cristian Moscoso is a 42 y.o. male.  Left arm pain  Radial head fracture suffered February 13  Elbow improving still some mild wrist pain knee completely better  Review of Systems:        Objective:    Pulse 88   Ht 185.4 cm (73\")   Wt 80.3 kg (177 lb)   BMI 23.35 kg/m²     Physical Examination:     Left elbow demonstrates mild pain over the radial head elbow range of motion is 10 to 90 degrees with 70 degrees pronation supination mild pain over the distal radial ulnar joint but no bony tenderness no snuffbox pain    Imaging:   left Wrist X-Ray  Indication: Fall radial head fracture  AP, Lateral oblique views  Findings: No fracture or malalignment  no bony lesion  Soft tissues normal  normal joint spaces  Hardware appropriately positioned not applicable      no prior studies available for comparison.  left Elbow X-Ray  Indication: Radial head fracture  Views: AP and Lateral views  Findings: Minimally displaced radial head fracture early fracture blurring  no bony lesion  Soft tissues normal  normal joint spaces  Hardware appropriately positioned not applicable      yes prior studies available for comparison.  Assessment:    Healing radial head fracture    Plan:   Continue off work x-ray of the elbow in 4 weeks begin elbow wrist and hand range of motion more aggressively      Procedures          Disclaimer: Part of this note may be an electronic transcription/translation of spoken language to printed text using the Dragon Dictation System  "

## 2023-03-15 ENCOUNTER — TELEPHONE (OUTPATIENT)
Dept: ORTHOPEDIC SURGERY | Facility: CLINIC | Age: 43
End: 2023-03-15
Payer: OTHER GOVERNMENT

## 2023-03-15 NOTE — TELEPHONE ENCOUNTER
Caller: EARL FLOOD    Relationship:  WITH FRANK    Best call back number: [phone]    What form or medical record are you requesting: OFFICE NOTES FROM 02.23.23 AND ANY ORDERS    Who is requesting this form or medical record from you: EARL WITH [employer]    How would you like to receive the form or medical records (pick-up, mail, fax):   If fax, what is the fax number: [phone]    Timeframe paperwork needed: ASAP    Additional notes: PATIENT'S , EARL FLOOD WAS CALLING TO REQUEST THE OFFICE NOTES FROM 02.23.23 AND ANY ORDERS TO BE FAXED [phone] ASAP. THANK YOU!

## 2023-03-23 ENCOUNTER — OFFICE VISIT (OUTPATIENT)
Dept: ORTHOPEDIC SURGERY | Facility: CLINIC | Age: 43
End: 2023-03-23
Payer: OTHER MISCELLANEOUS

## 2023-03-23 VITALS — WEIGHT: 177 LBS | BODY MASS INDEX: 23.35 KG/M2

## 2023-03-23 DIAGNOSIS — S52.122D CLOSED DISPLACED FRACTURE OF HEAD OF LEFT RADIUS WITH ROUTINE HEALING, SUBSEQUENT ENCOUNTER: ICD-10-CM

## 2023-03-23 DIAGNOSIS — M25.522 LEFT ELBOW PAIN: Primary | ICD-10-CM

## 2023-03-23 PROCEDURE — 99213 OFFICE O/P EST LOW 20 MIN: CPT | Performed by: ORTHOPAEDIC SURGERY

## 2023-03-23 NOTE — PROGRESS NOTES
Patient ID: Cristian Moscoso is a 42 y.o. male.  Left arm pain  Radial head fracture suffered February 13  Pain improving has been off of work  Review of Systems:        Objective:    Wt 80.3 kg (177 lb)   BMI 23.35 kg/m²     Physical Examination:  Left elbow demonstrates no tenderness elbow range of motion 0-1 30 has 85 degrees of pronation supination       Imaging:   left Elbow X-Ray  Indication: radial head fracture  Views: AP and Lateral views  Findings: Healed radial head fracture minimal displacement  no bony lesion  Soft tissues normal  normal joint spaces  Hardware appropriately positioned not applicable      yes prior studies available for comparison.    Assessment:    Healed radial head fracture    Plan:   I would begin physical therapy and work conditioning he can work light duty no lifting pushing pulling more than 5 pounds of the right arm see me in a month      Procedures          Disclaimer: Part of this note may be an electronic transcription/translation of spoken language to printed text using the Dragon Dictation System

## 2023-03-24 ENCOUNTER — TELEPHONE (OUTPATIENT)
Dept: ORTHOPEDIC SURGERY | Facility: CLINIC | Age: 43
End: 2023-03-24
Payer: OTHER GOVERNMENT

## 2023-03-24 NOTE — TELEPHONE ENCOUNTER
Caller: GREGORY    Relationship to patient: [facility]    Best call back number: [phone] - OPTION 2    Patient is needing: GREGORY WITH [facility] WAS CALLING TO LET YOUR OFFICE KNOW THAT THIS PATIENT HAS [facility] AND AUTHORIZATION IS NEEDED PRIOR TO SCHEDULING AN APPT. IF YOU HAVE ANY QUESTIONS YOU CAN GIVE GREGORY A CALL BACK AT  [phone] - OPTION 2. THANK YOU!

## 2023-03-24 NOTE — TELEPHONE ENCOUNTER
I called and spoke to Tiny and advised her this is work comp and order has been sent to CM at work comp per her request.

## 2023-03-30 ENCOUNTER — TELEPHONE (OUTPATIENT)
Dept: ORTHOPEDIC SURGERY | Facility: CLINIC | Age: 43
End: 2023-03-30

## 2023-03-30 NOTE — TELEPHONE ENCOUNTER
Caller: EARL FLOOD    Relationship to patient: NURSE     Best call back number: [phone]    Chief complaint: FOLLOW UP    Type of visit: FOLLOW UP     Requested date: 04/24/2023    If rescheduling, when is the original appointment: 04/17/2023    Additional notes: UNABLE TO WARM TRANSFER.

## 2023-04-03 ENCOUNTER — TELEPHONE (OUTPATIENT)
Dept: ORTHOPEDIC SURGERY | Facility: CLINIC | Age: 43
End: 2023-04-03
Payer: OTHER GOVERNMENT

## 2023-04-03 NOTE — TELEPHONE ENCOUNTER
Caller: [facility] FLOOD    Relationship: NURSE  WITH [facility]    Best call back number: [phone]    What orders are you requesting (i.e. lab or imaging): UPDATED ORDER FOR WORK CONDITIONING - 3 TO 4 TIMES PER WEEK    In what timeframe would the patient need to come in: ASAP    Where will you receive your lab/imaging services: [facility] IN [place]    PLEASE ALSO FAX A COPY TO EAR[facility]   FAX: [phone]

## 2023-04-05 ENCOUNTER — TELEPHONE (OUTPATIENT)
Dept: ORTHOPEDIC SURGERY | Facility: CLINIC | Age: 43
End: 2023-04-05

## 2023-04-05 NOTE — TELEPHONE ENCOUNTER
Caller: [facility]    Relationship to patient: WORK COMP     Best call back number:[phone]    Chief complaint: LEFT ELBOW     Type of visit:  FOLLOW UP     Requested date: ASAP      If rescheduling, when is the original appointment: 04/17/23    Additional notes: CX DUE TO PROVIDER OUT         EAR[facility] FROM  REQUEST EARLIER FOLLOW UP - AT TIME OF CALL FIRST AVAILABLE 05/18/23

## 2023-04-24 ENCOUNTER — OFFICE VISIT (OUTPATIENT)
Dept: ORTHOPEDIC SURGERY | Facility: CLINIC | Age: 43
End: 2023-04-24
Payer: OTHER MISCELLANEOUS

## 2023-04-24 VITALS — HEART RATE: 71 BPM | BODY MASS INDEX: 23.46 KG/M2 | HEIGHT: 73 IN | WEIGHT: 177 LBS

## 2023-04-24 DIAGNOSIS — M25.522 LEFT ELBOW PAIN: Primary | ICD-10-CM

## 2023-04-24 DIAGNOSIS — S52.122D CLOSED DISPLACED FRACTURE OF HEAD OF LEFT RADIUS WITH ROUTINE HEALING, SUBSEQUENT ENCOUNTER: ICD-10-CM

## 2023-04-24 PROCEDURE — 99212 OFFICE O/P EST SF 10 MIN: CPT | Performed by: ORTHOPAEDIC SURGERY

## 2023-04-24 NOTE — PROGRESS NOTES
"     Patient ID: Cristian Moscoso is a 42 y.o. male.  Left arm pain  Radial head fracture suffered February 13  Feeling good feels like he needs about 2 weeks of work conditioning to return fully  Review of Systems:        Objective:    Pulse 71   Ht 185.4 cm (73\")   Wt 80.3 kg (177 lb)   BMI 23.35 kg/m²     Physical Examination:  Left elbow demonstrates no pain over the radial head he has full range of motion the elbow minimal weakness on resisted bicep tricep testing mild weakness on resisted wrist flexion extension       Imaging:       Assessment:    Healed radial head fracture with improving strength    Plan:   2 more weeks of work conditioning they can return likely as tolerated see me in 2 weeks continue off work till then      Procedures          Disclaimer: Part of this note may be an electronic transcription/translation of spoken language to printed text using the Dragon Dictation System  "

## 2023-05-08 ENCOUNTER — OFFICE VISIT (OUTPATIENT)
Dept: ORTHOPEDIC SURGERY | Facility: CLINIC | Age: 43
End: 2023-05-08
Payer: OTHER MISCELLANEOUS

## 2023-05-08 VITALS — WEIGHT: 177 LBS | BODY MASS INDEX: 23.46 KG/M2 | HEART RATE: 64 BPM | HEIGHT: 73 IN

## 2023-05-08 DIAGNOSIS — S52.122D CLOSED DISPLACED FRACTURE OF HEAD OF LEFT RADIUS WITH ROUTINE HEALING, SUBSEQUENT ENCOUNTER: Primary | ICD-10-CM

## 2023-05-08 PROCEDURE — 99212 OFFICE O/P EST SF 10 MIN: CPT | Performed by: ORTHOPAEDIC SURGERY

## 2023-05-08 NOTE — PROGRESS NOTES
"     Patient ID: Cristian Moscoso is a 42 y.o. male.  Left arm pain  Radial head fracture suffered February 13  Pain resolved feels stronger after doing work conditioning    Review of Systems:        Objective:    Pulse 64   Ht 185.4 cm (73\")   Wt 80.3 kg (177 lb)   BMI 23.35 kg/m²     Physical Examination:     Left elbow demonstrates no tenderness he has full elbow and forearm range of motion no weakness on resisted bicep tricep or forearm strength testing    Imaging:       Assessment:    Healed radial head fracture    Plan:   Return to work without restrictions as of today is at [employer] as of today      Procedures          Disclaimer: Part of this note may be an electronic transcription/translation of spoken language to printed text using the Dragon Dictation System  "

## (undated) DEVICE — SYR LUERLOK 30CC

## (undated) DEVICE — SUT VIC 2/0 CT2 27IN J269H

## (undated) DEVICE — APPL CHLORAPREP HI/LITE 26ML ORNG

## (undated) DEVICE — UNDYED BRAIDED (POLYGLACTIN 910), SYNTHETIC ABSORBABLE SUTURE: Brand: COATED VICRYL

## (undated) DEVICE — TUBING, SUCTION, 1/4" X 20', STRAIGHT: Brand: MEDLINE INDUSTRIES, INC.

## (undated) DEVICE — IMMOB KN 3PNL DLX CANVS 22IN BLU

## (undated) DEVICE — GLV SURG BIOGEL LTX PF 6 1/2

## (undated) DEVICE — TUBING SET, GRAVITY, 4-SPIKE

## (undated) DEVICE — SUT ETHLN 4/0 PS2 18IN 1667H

## (undated) DEVICE — UNDERCAST PADDING: Brand: DEROYAL

## (undated) DEVICE — BLD SHVE RESEC COOLCT SABRE CRV 4MM 13CM

## (undated) DEVICE — BUR SHAVER CLEARCUT 12FLUT 5MM 13CM

## (undated) DEVICE — SOL ISO/ALC RUB 70PCT 4OZ

## (undated) DEVICE — KT ACL TRANSTIB WO/ SAWBLD

## (undated) DEVICE — GLV SURG SIGNATURE ESSENTIAL PF LTX SZ6.5

## (undated) DEVICE — GLV SURG BIOGEL LTX PF 8 1/2

## (undated) DEVICE — DRP C/ARM 41X74IN

## (undated) DEVICE — SKIN PREP TRAY W/CHG: Brand: MEDLINE INDUSTRIES, INC.

## (undated) DEVICE — BRACE KN P/OP TELESCP COOL TROM STD SZ

## (undated) DEVICE — DRL FLIPCUTTER3 6TO12MM DISP STRL

## (undated) DEVICE — ABL ASP APOLLO RF XL 90D

## (undated) DEVICE — PK ACL 40

## (undated) DEVICE — GLV SURG SIGNATURE ESSENTIAL PF LTX SZ8.5